# Patient Record
Sex: MALE | Race: BLACK OR AFRICAN AMERICAN | NOT HISPANIC OR LATINO | URBAN - METROPOLITAN AREA
[De-identification: names, ages, dates, MRNs, and addresses within clinical notes are randomized per-mention and may not be internally consistent; named-entity substitution may affect disease eponyms.]

---

## 2020-05-08 ENCOUNTER — OFFICE VISIT CONVERTED (OUTPATIENT)
Dept: ORTHOPEDIC SURGERY | Facility: CLINIC | Age: 20
End: 2020-05-08
Attending: ORTHOPAEDIC SURGERY

## 2020-05-29 ENCOUNTER — OFFICE VISIT CONVERTED (OUTPATIENT)
Dept: ORTHOPEDIC SURGERY | Facility: CLINIC | Age: 20
End: 2020-05-29
Attending: PHYSICIAN ASSISTANT

## 2020-06-12 ENCOUNTER — OFFICE VISIT CONVERTED (OUTPATIENT)
Dept: ORTHOPEDIC SURGERY | Facility: CLINIC | Age: 20
End: 2020-06-12
Attending: PHYSICIAN ASSISTANT

## 2020-07-02 ENCOUNTER — OFFICE VISIT CONVERTED (OUTPATIENT)
Dept: ORTHOPEDIC SURGERY | Facility: CLINIC | Age: 20
End: 2020-07-02
Attending: PHYSICIAN ASSISTANT

## 2021-05-10 NOTE — H&P
"   History and Physical      Patient Name: Logan Goel   Patient ID: [id number]   Sex: Male   YOB: 2000    Primary Care Provider: Maksim Jimenez MD    Visit Date: May 8, 2020    Provider: Alyssa Zimmerman MD   Location: [facility]   Location Address: [address] [place] [address]  [id number]   Location Phone: [phone]          Chief Complaint  · Right Hand Pain      History Of Present Illness  Logan Goel is a 19 year old /Black male who presents today to [facility]. Patient is here for right hand injury sustained while playing basketball in a correctional facility, punching the wall on 5/4/2020. Patient had x-rays at [facility]. Results are fractures of the mid shaft of the 3rd and 4th metacarpals. Patient states moderate pain and moderate swelling.       Past Surgical History  Metal implants         Allergy List  NO KNOWN DRUG ALLERGIES; NO KNOWN DRUG ALLERGIES         Social History  Alcohol Use (Current some day); lives with other; Recreational Drug Use (Former); Single.; Tobacco (Former); Unemployed.         Review of Systems  · Constitutional  o Denies  o : fever, chills, weight loss  · Cardiovascular  o Denies  o : chest pain, shortness of breath  · Gastrointestinal  o Denies  o : liver disease, heartburn, nausea, blood in stools  · Genitourinary  o Denies  o : painful urination, blood in urine  · Integument  o Denies  o : rash, itching  · Neurologic  o Denies  o : headache, weakness, loss of consciousness  · Musculoskeletal  o Admits  o : painful, swollen joints  · Psychiatric  o Denies  o : drug/alcohol addiction, anxiety, depression      Vitals  Date Time BP Position Site L\R Cuff Size HR RR TEMP (F) WT  HT  BMI kg/m2 BSA m2 O2 Sat HC       05/08/2020 10:31 AM         208lbs 0oz 5'  11\" 29.01 2.17           Physical Examination  · Constitutional  o Appearance  o : well developed, well-nourished, no obvious deformities " present  · Head and Face  o Head  o :   § Inspection  § : normocephalic  o Face  o :   § Inspection  § : no facial lesions  · Eyes  o Conjunctivae  o : conjunctivae normal  o Sclerae  o : sclerae white  · Ears, Nose, Mouth and Throat  o Ears  o :   § External Ears  § : appearance within normal limits  § Hearing  § : intact  o Nose  o :   § External Nose  § : appearance normal  · Neck  o Inspection/Palpation  o : normal appearance  o Range of Motion  o : full range of motion  · Respiratory  o Respiratory Effort  o : breathing unlabored  o Inspection of Chest  o : normal appearance  o Auscultation of Lungs  o : no audible wheezing or rales  · Cardiovascular  o Heart  o : regular rate  · Gastrointestinal  o Abdominal Examination  o : soft and non-tender  · Skin and Subcutaneous Tissue  o General Inspection  o : intact, no rashes  · Psychiatric  o General  o : Alert and oriented x3  o Judgement and Insight  o : judgment and insight intact  o Mood and Affect  o : mood normal, affect appropriate  · Right Hand  o Inspection  o : Mild swelling. Mild tenderness over the 3rd and 4th metacarpal. He is able to make a full fist. Neurovascularly and sensation grossly intact.   · Casting  o Extremity  o : Right Hand, Short Arm Cast  o Procedure  o : Closed treatment was obtained and fiberglass cast was applied. The patient tolerated the procedure without any complications          Assessment  · Arthralgia of right hand     719.44/M25.541  · Metacarpal fracture, right 3rd and 4th.     816.00/S62.609A      Plan  · Orders  o Casting Supplies (Short Arm) Adult () - - 05/08/2020   NB  o Application of short arm cast (48741) - - 05/08/2020   NB  · Medications  o Medications have been Reconciled  o Transition of Care or Provider Policy  · Instructions  o Reviewed the patient's Past Medical, Social, and Family history as well as the ROS at today's visit, no changes.  o Call or return if worsening symptoms.  o This note is transcribed  by Minerva teague/jyoti  o Plan is short arm cast. Follow-up in 3 weeks, remove cast and x-ray at that time. May proceed with a brace if needed.             Electronically Signed by: Minerva Monzon, -Author on May 11, 2020 10:29:41 AM  Electronically Co-signed by: Erika Bond PA-C -Reviewer on May 11, 2020 11:32:34 AM  Electronically Co-signed by: Alyssa Zimmerman MD -Reviewer on May 12, 2020 11:12:01 AM

## 2021-05-13 NOTE — PROGRESS NOTES
"   Progress Note      Patient Name: Logan Goel   Patient ID: [id number]   Sex: Male   YOB: 2000    Primary Care Provider: Maksim Jimenez MD    Visit Date: June 12, 2020    Provider: Kinza Echeverria PA-C   Location: [facility]   Location Address: [address] [place] [address]  [id number]   Location Phone: [phone]          Chief Complaint  · Follow up right hand      History Of Present Illness  Logan Goel is a 19 year old /Black male who presents today to [facility].      He is here for follow up for right third and fourth metacarpal fractures sustained 5/4/20. He denies pain. He has been using exoskeleton brace.       Past Surgical History  Metal implants         Allergy List  NO KNOWN DRUG ALLERGIES; NO KNOWN DRUG ALLERGIES       Allergies Reconciled  Social History  Alcohol Use (Current some day); lives with other; Recreational Drug Use (Former); Single.; Tobacco (Former); Unemployed.         Review of Systems  · Constitutional  o Denies  o : fever, chills, weight loss  · Cardiovascular  o Denies  o : chest pain, shortness of breath  · Gastrointestinal  o Denies  o : liver disease, heartburn, nausea, blood in stools  · Genitourinary  o Denies  o : painful urination, blood in urine  · Integument  o Denies  o : rash, itching  · Neurologic  o Denies  o : headache, weakness, loss of consciousness  · Musculoskeletal  o Admits  o : painful, swollen joints  · Psychiatric  o Denies  o : drug/alcohol addiction, anxiety, depression      Vitals  Date Time BP Position Site L\R Cuff Size HR RR TEMP (F) WT  HT  BMI kg/m2 BSA m2 O2 Sat HC       06/12/2020 11:20 AM      66 - R   204lbs 16oz 5'  11\" 28.59 2.16 98 %          Physical Examination  · Constitutional  o Appearance  o : well developed, well-nourished, no obvious deformities present  · Head and Face  o Head  o :   § Inspection  § : normocephalic  o Face  o :   § Inspection  § : no facial " lesions  · Eyes  o Conjunctivae  o : conjunctivae normal  o Sclerae  o : sclerae white  · Ears, Nose, Mouth and Throat  o Ears  o :   § External Ears  § : appearance within normal limits  § Hearing  § : intact  o Nose  o :   § External Nose  § : appearance normal  · Neck  o Inspection/Palpation  o : normal appearance  o Range of Motion  o : full range of motion  · Respiratory  o Respiratory Effort  o : breathing unlabored  o Inspection of Chest  o : normal appearance  o Auscultation of Lungs  o : no audible wheezing or rales  · Cardiovascular  o Heart  o : regular rate  · Gastrointestinal  o Abdominal Examination  o : soft and non-tender  · Skin and Subcutaneous Tissue  o General Inspection  o : intact, no rashes  · Psychiatric  o General  o : Alert and oriented x3  o Judgement and Insight  o : judgment and insight intact  o Mood and Affect  o : mood normal, affect appropriate  · Right Hand  o Inspection  o : No swelling. Nontender 3/4th metacarpals. Full digit ROM. Sensation grossly intact. Brisk capillary refill.   · In Office Procedures  o View  o : AP/LATERAL  o Site  o : right, hand   o Indication  o : Right arm pain   o Study  o : X-rays ordered, taken in the office, and reviewed today.  o Xray  o : Good evidence of healing of 3/4th metacarpal fractures.   o Comparative Data  o : Comparative Data found and reviewed today           Assessment  · Fracture: Metacarpal     815.00  · Pain: Hand     719.44/M79.643      Plan  · Orders  o Hand (Right) [facility] Preferred View ([id number]) - 719.44/M79.643 - 06/12/2020  · Medications  o Medications have been Reconciled  o Transition of Care or Provider Policy  · Instructions  o Reviewed the patient's Past Medical, Social, and Family history as well as the ROS at today's visit, no changes.  o Call or return if worsening symptoms.  o Follow Up in 3 weeks. Repeat x-rays. May wean out of brace.   o Electronically Identified Patient Education Materials Provided  Electronically            Electronically Signed by: Kinza Echeverria PA-C -Author on June 12, 2020 11:46:04 AM  Electronically Co-signed by: Alyssa Zimmerman MD -Reviewer on June 16, 2020 09:11:09 AM

## 2021-05-13 NOTE — PROGRESS NOTES
"   Progress Note      Patient Name: Logan Goel   Patient ID: [id number]   Sex: Male   YOB: 2000    Primary Care Provider: Maksim Jimenez MD    Visit Date: May 29, 2020    Provider: Kinza Echeverria PA-C   Location: [facility]   Location Address: [address] [place] [address]  [id number]   Location Phone: [phone]          Chief Complaint  · Follow up right hand       History Of Present Illness  Logan Goel is a 19 year old /Black male who presents today to [facility].      He is here for follow up for right 3/4th metacarpal fractures sustained 5/4/20, when he punched a wall. He has been in short arm cast. He states mild pain. Cast is removed today.       Past Surgical History  Metal implants         Allergy List  NO KNOWN DRUG ALLERGIES; NO KNOWN DRUG ALLERGIES       Allergies Reconciled  Social History  Alcohol Use (Current some day); lives with other; Recreational Drug Use (Former); Single.; Tobacco (Former); Unemployed.         Review of Systems  · Constitutional  o Denies  o : fever, chills, weight loss  · Cardiovascular  o Denies  o : chest pain, shortness of breath  · Gastrointestinal  o Denies  o : liver disease, heartburn, nausea, blood in stools  · Genitourinary  o Denies  o : painful urination, blood in urine  · Integument  o Denies  o : rash, itching  · Neurologic  o Denies  o : headache, weakness, loss of consciousness  · Musculoskeletal  o Admits  o : painful, swollen joints  · Psychiatric  o Denies  o : drug/alcohol addiction, anxiety, depression      Vitals  Date Time BP Position Site L\R Cuff Size HR RR TEMP (F) WT  HT  BMI kg/m2 BSA m2 O2 Sat        05/29/2020 09:22 AM      67 - R   204lbs 16oz 5'  11\" 28.59 2.16 97 %          Physical Examination  · Constitutional  o Appearance  o : well developed, well-nourished, no obvious deformities present  · Head and Face  o Head  o :   § Inspection  § : normocephalic  o Face  o :   § Inspection  § : " no facial lesions  · Eyes  o Conjunctivae  o : conjunctivae normal  o Sclerae  o : sclerae white  · Ears, Nose, Mouth and Throat  o Ears  o :   § External Ears  § : appearance within normal limits  § Hearing  § : intact  o Nose  o :   § External Nose  § : appearance normal  · Neck  o Inspection/Palpation  o : normal appearance  o Range of Motion  o : full range of motion  · Respiratory  o Respiratory Effort  o : breathing unlabored  o Inspection of Chest  o : normal appearance  o Auscultation of Lungs  o : no audible wheezing or rales  · Cardiovascular  o Heart  o : regular rate  · Gastrointestinal  o Abdominal Examination  o : soft and non-tender  · Skin and Subcutaneous Tissue  o General Inspection  o : intact, no rashes  · Psychiatric  o General  o : Alert and oriented x3  o Judgement and Insight  o : judgment and insight intact  o Mood and Affect  o : mood normal, affect appropriate  · Right Hand  o Inspection  o : No deformity or swelling. Tender along shaft of 3rd metacarpal. Full digit ROM. All compartments soft and well perfused. Sensation grossly intact.   · In Office Procedures  o View  o : AP/LATERAL  o Site  o : right, hand   o Indication  o : Right arm pain   o Study  o : X-rays ordered, taken in the office, and reviewed today.  o Xray  o : Interval healing of third and fourth nondisplaced metacarpal fractures.   o Comparative Data  o : Comparative Data found and reviewed today           Assessment  · Pain: Hand     719.44/M79.643  · Hand fracture     815.00/S62.90XA      Plan  · Orders  o Hand (Right) [facility] Preferred View ([id number]) - 719.44/M79.643 - 05/29/2020  · Instructions  o Reviewed the patient's Past Medical, Social, and Family history as well as the ROS at today's visit, no changes.  o Call or return if worsening symptoms.  o Follow Up in 2 weeks. Repeat x-rays.             Electronically Signed by: Kinza Echeverria PA-C -Author on May 29, 2020 10:05:07 AM

## 2021-05-13 NOTE — PROGRESS NOTES
Progress Note      Patient Name: Logan Goel   Patient ID: [id number]   Sex: Male   YOB: 2000    Primary Care Provider: Maksim Jimenez MD    Visit Date: July 2, 2020    Provider: Kinza Echeverria PA-C   Location: [facility]   Location Address: [address] [place] [address]  [id number]   Location Phone: [phone]          Chief Complaint  · Follow up right hand       History Of Present Illness  Logan Goel is a 19 year old /Black male who presents today to [facility].      He is here for 3rd/4th metacarpal fractures. He denies pain. he has weaned from brace.       Past Surgical History  Metal implants         Allergy List  NO KNOWN DRUG ALLERGIES; NO KNOWN DRUG ALLERGIES       Allergies Reconciled  Social History  Alcohol Use (Current some day); lives with other; Recreational Drug Use (Former); Single.; Tobacco (Former); Unemployed.         Review of Systems  · Constitutional  o Denies  o : fever, chills, weight loss  · Cardiovascular  o Denies  o : chest pain, shortness of breath  · Gastrointestinal  o Denies  o : liver disease, heartburn, nausea, blood in stools  · Genitourinary  o Denies  o : painful urination, blood in urine  · Integument  o Denies  o : rash, itching  · Neurologic  o Denies  o : headache, weakness, loss of consciousness  · Musculoskeletal  o Denies  o : painful, swollen joints  · Psychiatric  o Denies  o : drug/alcohol addiction, anxiety, depression      Vitals  Date Time BP Position Site L\R Cuff Size HR RR TEMP (F) WT  HT  BMI kg/m2 BSA m2 O2 Sat        07/02/2020 01:36 PM      70 - R   214lbs 16oz 6'   29.16 2.23 99 %          Physical Examination  · Constitutional  o Appearance  o : well developed, well-nourished, no obvious deformities present  · Head and Face  o Head  o :   § Inspection  § : normocephalic  o Face  o :   § Inspection  § : no facial lesions  · Eyes  o Conjunctivae  o : conjunctivae normal  o Sclerae  o : sclerae  white  · Ears, Nose, Mouth and Throat  o Ears  o :   § External Ears  § : appearance within normal limits  § Hearing  § : intact  o Nose  o :   § External Nose  § : appearance normal  · Neck  o Inspection/Palpation  o : normal appearance  o Range of Motion  o : full range of motion  · Respiratory  o Respiratory Effort  o : breathing unlabored  o Inspection of Chest  o : normal appearance  o Auscultation of Lungs  o : no audible wheezing or rales  · Cardiovascular  o Heart  o : regular rate  · Gastrointestinal  o Abdominal Examination  o : soft and non-tender  · Skin and Subcutaneous Tissue  o General Inspection  o : intact, no rashes  · Psychiatric  o General  o : Alert and oriented x3  o Judgement and Insight  o : judgment and insight intact  o Mood and Affect  o : mood normal, affect appropriate  · Right Hand  o Inspection  o : Normal appearing. Nontender. Full ROM of digits. Neurovascularly intact.  · In Office Procedures  o View  o : AP/LATERAL  o Site  o : right, hand   o Indication  o : Right arm pain   o Study  o : X-rays ordered, taken in the office, and reviewed today.  o Xray  o : Well healed third and fourth metacarpal fractures.   o Comparative Data  o : Comparative Data found and reviewed today           Assessment  · Fracture: Metacarpal     815.00  · Pain: Hand     719.44/M79.643      Plan  · Orders  o Hand (Right) [facility] Preferred View ([id number]) - 719.44/M79.643 - 07/02/2020  · Medications  o Medications have been Reconciled  o Transition of Care or Provider Policy  · Instructions  o Reviewed the patient's Past Medical, Social, and Family history as well as the ROS at today's visit, no changes.  o Call or return if worsening symptoms.  o Follow Up PRN.  o Electronically Identified Patient Education Materials Provided Electronically            Electronically Signed by: MISTY RodriguezC -Author on July 2, 2020 02:24:02 PM

## 2021-05-15 VITALS — OXYGEN SATURATION: 99 % | HEIGHT: 72 IN | BODY MASS INDEX: 29.12 KG/M2 | HEART RATE: 70 BPM | WEIGHT: 215 LBS

## 2021-05-15 VITALS — BODY MASS INDEX: 29.12 KG/M2 | WEIGHT: 208 LBS | HEIGHT: 71 IN

## 2021-05-15 VITALS — BODY MASS INDEX: 28.7 KG/M2 | WEIGHT: 205 LBS | OXYGEN SATURATION: 97 % | HEIGHT: 71 IN | HEART RATE: 67 BPM

## 2021-05-15 VITALS — HEART RATE: 66 BPM | HEIGHT: 71 IN | BODY MASS INDEX: 28.7 KG/M2 | OXYGEN SATURATION: 98 % | WEIGHT: 205 LBS

## 2021-05-19 ENCOUNTER — HOSPITAL ENCOUNTER (OUTPATIENT)
Dept: VACCINE CLINIC | Facility: HOSPITAL | Age: 21
Discharge: HOME | End: 2021-05-19
Attending: INTERNAL MEDICINE